# Patient Record
Sex: MALE | Race: WHITE | NOT HISPANIC OR LATINO | URBAN - METROPOLITAN AREA
[De-identification: names, ages, dates, MRNs, and addresses within clinical notes are randomized per-mention and may not be internally consistent; named-entity substitution may affect disease eponyms.]

---

## 2020-09-02 ENCOUNTER — OFFICE VISIT (OUTPATIENT)
Dept: URBAN - METROPOLITAN AREA CLINIC 121 | Facility: CLINIC | Age: 75
End: 2020-09-02

## 2021-03-11 ENCOUNTER — OFFICE VISIT (OUTPATIENT)
Dept: URBAN - METROPOLITAN AREA CLINIC 63 | Facility: CLINIC | Age: 76
End: 2021-03-11

## 2021-04-19 ENCOUNTER — OFFICE VISIT (OUTPATIENT)
Dept: URBAN - METROPOLITAN AREA SURGERY CENTER 4 | Facility: SURGERY CENTER | Age: 76
End: 2021-04-19

## 2021-04-21 LAB — PATHOLOGY (INDENTED REPORT): (no result)

## 2021-05-10 ENCOUNTER — OFFICE VISIT (OUTPATIENT)
Dept: URBAN - METROPOLITAN AREA CLINIC 63 | Facility: CLINIC | Age: 76
End: 2021-05-10

## 2022-07-09 ENCOUNTER — TELEPHONE ENCOUNTER (OUTPATIENT)
Dept: URBAN - METROPOLITAN AREA CLINIC 121 | Facility: CLINIC | Age: 77
End: 2022-07-09

## 2022-07-09 RX ORDER — COCOA BUTTER, PHENYLEPHRINE HYDROCHLORIDE 2211; 6.25 MG/1; MG/1
TAKE AS DIRECTED SUPPOSITORY RECTAL TAKE AS DIRECTED
Refills: 1 | OUTPATIENT
Start: 2016-03-17 | End: 2016-04-28

## 2022-07-09 RX ORDER — SILDENAFIL CITRATE 100 MG/1
TABLET, FILM COATED ORAL AS NEEDED
Refills: 0 | OUTPATIENT
Start: 2016-03-17 | End: 2016-04-28

## 2022-07-09 RX ORDER — DILTIAZEM HYDROCHLORIDE 120 MG/1
TABLET ORAL ONCE A DAY
Refills: 0 | OUTPATIENT
Start: 2014-11-10 | End: 2016-03-17

## 2022-07-09 RX ORDER — ROSUVASTATIN CALCIUM 10 MG
TABLET ORAL ONCE A DAY
Refills: 0 | OUTPATIENT
Start: 2017-09-14 | End: 2018-06-28

## 2022-07-09 RX ORDER — ATENOLOL 100 MG/1
TABLET ORAL ONCE A DAY
Refills: 0 | OUTPATIENT
Start: 2014-11-10 | End: 2016-03-17

## 2022-07-09 RX ORDER — WARFARIN 10 MG/1
TABLET ORAL TAKE AS DIRECTED
Refills: 0 | OUTPATIENT
Start: 2014-11-10 | End: 2016-03-17

## 2022-07-09 RX ORDER — DILTIAZEM HYDROCHLORIDE 180 MG/1
CAPSULE, EXTENDED RELEASE ORAL
Refills: 0 | OUTPATIENT
Start: 2021-02-20 | End: 2021-03-11

## 2022-07-09 RX ORDER — LISINOPRIL 20 MG/1
TABLET ORAL TWICE A DAY
Refills: 0 | OUTPATIENT
Start: 2021-03-11 | End: 2021-05-10

## 2022-07-09 RX ORDER — ROSUVASTATIN CALCIUM 10 MG/1
TABLET, FILM COATED ORAL
Refills: 0 | OUTPATIENT
Start: 2021-01-24 | End: 2021-03-11

## 2022-07-09 RX ORDER — METOPROLOL SUCCINATE 50 MG/1
TABLET, EXTENDED RELEASE ORAL
Refills: 0 | OUTPATIENT
Start: 2021-01-24 | End: 2021-03-11

## 2022-07-09 RX ORDER — HYDROCHLOROTHIAZIDE 12.5 MG/1
CAPSULE ORAL ONCE A DAY
Refills: 0 | OUTPATIENT
Start: 2017-09-14 | End: 2018-06-28

## 2022-07-09 RX ORDER — LISINOPRIL 20 MG/1
TABLET ORAL TAKE AS DIRECTED
Refills: 0 | OUTPATIENT
Start: 2017-09-14 | End: 2018-06-28

## 2022-07-09 RX ORDER — VITAMIN E (DL,TOCOPHERYL ACET) 180 MG
CAPSULE ORAL TWICE A DAY
Refills: 0 | OUTPATIENT
Start: 2021-03-11 | End: 2021-05-10

## 2022-07-09 RX ORDER — ROSUVASTATIN CALCIUM 10 MG
TABLET ORAL ONCE A DAY
Refills: 0 | OUTPATIENT
Start: 2018-06-28 | End: 2021-03-11

## 2022-07-09 RX ORDER — APIXABAN 5 MG/1
TABLET, FILM COATED ORAL TWICE A DAY
Refills: 0 | OUTPATIENT
Start: 2021-03-11 | End: 2021-05-10

## 2022-07-09 RX ORDER — HYDROCHLOROTHIAZIDE 12.5 MG/1
CAPSULE ORAL ONCE A DAY
Refills: 0 | OUTPATIENT
Start: 2018-06-28 | End: 2018-06-28

## 2022-07-09 RX ORDER — SILDENAFIL CITRATE 100 MG/1
TABLET, FILM COATED ORAL TAKE AS DIRECTED
Refills: 0 | OUTPATIENT
Start: 2014-11-10 | End: 2016-03-17

## 2022-07-09 RX ORDER — ZOLPIDEM TARTRATE 12.5 MG
TABLET, EXTENDED RELEASE MULTIPHASE ORAL TAKE AS DIRECTED
Refills: 0 | OUTPATIENT
Start: 2014-06-19 | End: 2014-11-10

## 2022-07-09 RX ORDER — LISINOPRIL 10 MG/1
TABLET ORAL ONCE A DAY
Refills: 0 | OUTPATIENT
Start: 2014-11-10 | End: 2016-03-17

## 2022-07-09 RX ORDER — ROSUVASTATIN CALCIUM 5 MG
TABLET ORAL TAKE AS DIRECTED
Refills: 0 | OUTPATIENT
Start: 2014-06-19 | End: 2014-11-10

## 2022-07-09 RX ORDER — ZOLPIDEM TARTRATE 12.5 MG
TABLET, EXTENDED RELEASE MULTIPHASE ORAL TAKE AS DIRECTED
Refills: 0 | OUTPATIENT
Start: 2014-11-10 | End: 2016-03-17

## 2022-07-09 RX ORDER — NYSTATIN 100000 [USP'U]/ML
TAKE AS DIRECTED5CC PO SWISH AND SPIT BID SUSPENSION ORAL TAKE AS DIRECTED
Refills: 2 | OUTPATIENT
Start: 2017-09-14 | End: 2018-06-28

## 2022-07-09 RX ORDER — ROSUVASTATIN CALCIUM 10 MG
TABLET ORAL ONCE A DAY
Refills: 0 | OUTPATIENT
Start: 2016-03-17 | End: 2016-04-28

## 2022-07-09 RX ORDER — ATENOLOL 50 MG/1
TABLET ORAL ONCE A DAY
Refills: 0 | OUTPATIENT
Start: 2016-04-28 | End: 2016-09-22

## 2022-07-09 RX ORDER — METHIMAZOLE 10 MG/1
TABLET ORAL
Refills: 0 | OUTPATIENT
Start: 2020-09-10 | End: 2021-03-11

## 2022-07-09 RX ORDER — ROSUVASTATIN CALCIUM 10 MG
TABLET ORAL ONCE A DAY
Refills: 0 | OUTPATIENT
Start: 2016-04-28 | End: 2016-09-22

## 2022-07-09 RX ORDER — ASPIRIN 81 MG/1
TABLET, COATED ORAL ONCE A DAY
Refills: 0 | OUTPATIENT
Start: 2016-03-17 | End: 2016-04-28

## 2022-07-09 RX ORDER — UNDERPADS 30"X36"
EACH MISCELLANEOUS ONCE A DAY
Refills: 0 | OUTPATIENT
Start: 2018-06-28 | End: 2021-03-11

## 2022-07-09 RX ORDER — NYSTATIN 100000 [USP'U]/ML
TAKE AS DIRECTED5CC PO BID  SWISH AND SPIT SUSPENSION ORAL TAKE AS DIRECTED
Refills: 0 | OUTPATIENT
Start: 2016-04-28 | End: 2016-09-22

## 2022-07-09 RX ORDER — WARFARIN 10 MG/1
TABLET ORAL TAKE AS DIRECTED
Refills: 0 | OUTPATIENT
Start: 2014-06-19 | End: 2014-11-10

## 2022-07-09 RX ORDER — ALBUTEROL SULFATE 90 UG/1
AEROSOL, METERED RESPIRATORY (INHALATION) AS NEEDED
Refills: 0 | OUTPATIENT
Start: 2021-03-11 | End: 2021-05-10

## 2022-07-09 RX ORDER — DILTIAZEM HYDROCHLORIDE 120 MG/1
TABLET ORAL TAKE AS DIRECTED
Refills: 0 | OUTPATIENT
Start: 2018-06-28 | End: 2021-03-11

## 2022-07-09 RX ORDER — LISINOPRIL 20 MG/1
TABLET ORAL ONCE A DAY
Refills: 0 | OUTPATIENT
Start: 2016-03-17 | End: 2016-04-28

## 2022-07-09 RX ORDER — SILDENAFIL CITRATE 100 MG/1
TABLET, FILM COATED ORAL AS NEEDED
Refills: 2 | OUTPATIENT
Start: 2016-04-28 | End: 2016-09-22

## 2022-07-09 RX ORDER — MAGNESIUM HYDROXIDE 400 MG/5ML
SUSPENSION, ORAL (FINAL DOSE FORM) ORAL ONCE A DAY
Refills: 0 | OUTPATIENT
Start: 2016-04-28 | End: 2016-09-22

## 2022-07-09 RX ORDER — POLYETHYLENE GLYCOL 3350, SODIUM SULFATE, SODIUM CHLORIDE, POTASSIUM CHLORIDE, ASCORBIC ACID, SODIUM ASCORBATE 7.5-2.691G
KIT ORAL TAKE AS DIRECTED
Refills: 0 | OUTPATIENT
Start: 2014-06-19 | End: 2014-11-10

## 2022-07-09 RX ORDER — HYDROCHLOROTHIAZIDE 12.5 MG/1
TABLET ORAL ONCE A DAY
Refills: 0 | OUTPATIENT
Start: 2016-03-17 | End: 2016-04-28

## 2022-07-09 RX ORDER — ASPIRIN 81 MG/1
TABLET, COATED ORAL ONCE A DAY
Refills: 2 | OUTPATIENT
Start: 2016-04-28 | End: 2016-09-22

## 2022-07-09 RX ORDER — METOPROLOL SUCCINATE 50 MG/1
TABLET, EXTENDED RELEASE ORAL ONCE A DAY
Refills: 0 | OUTPATIENT
Start: 2021-03-11 | End: 2021-05-10

## 2022-07-09 RX ORDER — WARFARIN 10 MG/1
TABLET ORAL ONCE A DAY
Refills: 0 | OUTPATIENT
Start: 2017-09-14 | End: 2018-06-28

## 2022-07-09 RX ORDER — DILTIAZEM HYDROCHLORIDE 120 MG/1
TABLET ORAL ONCE A DAY
Refills: 0 | OUTPATIENT
Start: 2016-03-17 | End: 2016-04-28

## 2022-07-09 RX ORDER — MULTIVIT-MIN/FERROUS FUMARATE 9 MG/15 ML
LIQUID (ML) ORAL ONCE A DAY
Refills: 0 | OUTPATIENT
Start: 2021-03-11 | End: 2021-05-10

## 2022-07-09 RX ORDER — LISINOPRIL 20 MG/1
TABLET ORAL
Refills: 0 | OUTPATIENT
Start: 2021-01-24 | End: 2021-03-11

## 2022-07-09 RX ORDER — HYDROCHLOROTHIAZIDE 12.5 MG/1
TABLET ORAL ONCE A DAY
Refills: 0 | OUTPATIENT
Start: 2016-04-28 | End: 2016-09-22

## 2022-07-09 RX ORDER — LISINOPRIL 20 MG/1
TABLET ORAL TAKE AS DIRECTED
Refills: 0 | OUTPATIENT
Start: 2018-06-28 | End: 2018-06-28

## 2022-07-09 RX ORDER — ATENOLOL 50 MG/1
TABLET ORAL ONCE A DAY
Refills: 0 | OUTPATIENT
Start: 2017-09-14 | End: 2018-06-28

## 2022-07-09 RX ORDER — ATENOLOL 50 MG/1
TABLET ORAL ONCE A DAY
Refills: 0 | OUTPATIENT
Start: 2016-03-17 | End: 2016-04-28

## 2022-07-09 RX ORDER — MELATONIN 3 MG
CAPSULE ORAL ONCE A DAY
Refills: 0 | OUTPATIENT
Start: 2021-03-11 | End: 2021-05-10

## 2022-07-09 RX ORDER — DILTIAZEM HYDROCHLORIDE 240 MG/1
CAPSULE, EXTENDED RELEASE ORAL ONCE A DAY
Refills: 0 | OUTPATIENT
Start: 2021-03-11 | End: 2021-05-10

## 2022-07-09 RX ORDER — UNDERPADS 30"X36"
EACH MISCELLANEOUS ONCE A DAY
Refills: 0 | OUTPATIENT
Start: 2017-09-14 | End: 2018-06-28

## 2022-07-09 RX ORDER — MAGNESIUM HYDROXIDE 400 MG/5ML
SUSPENSION, ORAL (FINAL DOSE FORM) ORAL ONCE A DAY
Refills: 0 | OUTPATIENT
Start: 2016-03-17 | End: 2016-04-28

## 2022-07-09 RX ORDER — LISINOPRIL 20 MG/1
TABLET ORAL ONCE A DAY
Refills: 0 | OUTPATIENT
Start: 2016-04-28 | End: 2016-09-22

## 2022-07-09 RX ORDER — LISINOPRIL 10 MG/1
TABLET ORAL ONCE A DAY
Refills: 0 | OUTPATIENT
Start: 2014-06-19 | End: 2014-11-10

## 2022-07-09 RX ORDER — CELECOXIB 200 MG/1
CAPSULE ORAL
Refills: 0 | OUTPATIENT
Start: 2020-12-19 | End: 2021-03-11

## 2022-07-09 RX ORDER — ROSUVASTATIN CALCIUM 5 MG
TABLET ORAL TAKE AS DIRECTED
Refills: 0 | OUTPATIENT
Start: 2014-11-10 | End: 2016-03-17

## 2022-07-09 RX ORDER — AMILORIDE HYDROCHLORIDE AND HYDROCHLOROTHIAZIDE 5; 50 MG/1; MG/1
TABLET ORAL
Refills: 0 | OUTPATIENT
Start: 2014-08-26 | End: 2016-03-17

## 2022-07-09 RX ORDER — OXYBUTYNIN CHLORIDE 5 MG/1
TABLET ORAL
Refills: 0 | OUTPATIENT
Start: 2021-02-25 | End: 2021-03-11

## 2022-07-09 RX ORDER — DILTIAZEM HYDROCHLORIDE 120 MG/1
TABLET ORAL ONCE A DAY
Refills: 0 | OUTPATIENT
Start: 2014-06-19 | End: 2014-11-10

## 2022-07-09 RX ORDER — DILTIAZEM HYDROCHLORIDE 120 MG/1
TABLET ORAL ONCE A DAY
Refills: 0 | OUTPATIENT
Start: 2016-04-28 | End: 2016-09-22

## 2022-07-09 RX ORDER — ATENOLOL 100 MG/1
TABLET ORAL ONCE A DAY
Refills: 0 | OUTPATIENT
Start: 2014-06-19 | End: 2014-11-10

## 2022-07-09 RX ORDER — DIPHENHYDRAMINE HCL, IBUPROFEN 25; 200 MG/1; MG/1
CAPSULE, LIQUID FILLED ORAL ONCE A DAY
Refills: 2 | OUTPATIENT
Start: 2016-04-28 | End: 2016-09-22

## 2022-07-09 RX ORDER — APIXABAN 5 MG/1
TABLET, FILM COATED ORAL
Refills: 0 | OUTPATIENT
Start: 2021-02-22 | End: 2021-03-11

## 2022-07-09 RX ORDER — SILDENAFIL CITRATE 100 MG/1
TABLET, FILM COATED ORAL TAKE AS DIRECTED
Refills: 0 | OUTPATIENT
Start: 2014-06-19 | End: 2014-11-10

## 2022-07-09 RX ORDER — DILTIAZEM HYDROCHLORIDE 120 MG/1
TABLET ORAL TAKE AS DIRECTED
Refills: 0 | OUTPATIENT
Start: 2018-06-28 | End: 2018-06-28

## 2022-07-09 RX ORDER — CELECOXIB 200 MG/1
CAPSULE ORAL ONCE A DAY
Refills: 0 | OUTPATIENT
Start: 2021-03-11 | End: 2021-05-10

## 2022-07-09 RX ORDER — DILTIAZEM HYDROCHLORIDE 120 MG/1
TABLET ORAL TAKE AS DIRECTED
Refills: 0 | OUTPATIENT
Start: 2017-09-14 | End: 2018-06-28

## 2022-07-09 RX ORDER — OXYBUTYNIN CHLORIDE 5 MG/1
TABLET ORAL ONCE A DAY
Refills: 0 | OUTPATIENT
Start: 2021-03-11 | End: 2021-05-10

## 2022-07-10 ENCOUNTER — TELEPHONE ENCOUNTER (OUTPATIENT)
Dept: URBAN - METROPOLITAN AREA CLINIC 121 | Facility: CLINIC | Age: 77
End: 2022-07-10

## 2022-07-10 RX ORDER — WARFARIN 10 MG/1
TABLET ORAL ONCE A DAY
Refills: 0 | Status: ACTIVE | COMMUNITY
Start: 2018-06-28

## 2022-07-10 RX ORDER — DIPHENHYDRAMINE HCL, IBUPROFEN 25; 200 MG/1; MG/1
CAPSULE, LIQUID FILLED ORAL ONCE A DAY
Refills: 2 | Status: ACTIVE | COMMUNITY
Start: 2016-09-22

## 2022-07-10 RX ORDER — LISINOPRIL 20 MG/1
TABLET ORAL TWICE A DAY
Refills: 0 | Status: ACTIVE | COMMUNITY
Start: 2021-05-10

## 2022-07-10 RX ORDER — ATENOLOL 50 MG/1
TABLET ORAL ONCE A DAY
Refills: 0 | Status: ACTIVE | COMMUNITY
Start: 2016-09-22

## 2022-07-10 RX ORDER — DILTIAZEM HYDROCHLORIDE 120 MG/1
TABLET ORAL ONCE A DAY
Refills: 0 | Status: ACTIVE | COMMUNITY
Start: 2016-09-22

## 2022-07-10 RX ORDER — ROSUVASTATIN CALCIUM 10 MG
TABLET ORAL ONCE A DAY
Refills: 0 | Status: ACTIVE | COMMUNITY
Start: 2016-09-22

## 2022-07-10 RX ORDER — MELATONIN 3 MG
CAPSULE ORAL ONCE A DAY
Refills: 0 | Status: ACTIVE | COMMUNITY
Start: 2021-05-10

## 2022-07-10 RX ORDER — OXYBUTYNIN CHLORIDE 5 MG/1
TABLET ORAL ONCE A DAY
Refills: 0 | Status: ACTIVE | COMMUNITY
Start: 2021-05-10

## 2022-07-10 RX ORDER — NYSTATIN 100000 [USP'U]/ML
TAKE AS DIRECTED5CC PO SWISH AND SPIT BID SUSPENSION ORAL TAKE AS DIRECTED
Refills: 2 | Status: ACTIVE | COMMUNITY
Start: 2018-06-28

## 2022-07-10 RX ORDER — MULTIVIT-MIN/FERROUS FUMARATE 9 MG/15 ML
LIQUID (ML) ORAL ONCE A DAY
Refills: 0 | Status: ACTIVE | COMMUNITY
Start: 2021-05-10

## 2022-07-10 RX ORDER — DILTIAZEM HYDROCHLORIDE 240 MG/1
CAPSULE, EXTENDED RELEASE ORAL ONCE A DAY
Refills: 0 | Status: ACTIVE | COMMUNITY
Start: 2021-05-10

## 2022-07-10 RX ORDER — METHIMAZOLE 10 MG/1
TABLET ORAL TAKE AS DIRECTED
Refills: 0 | Status: ACTIVE | COMMUNITY
Start: 2021-03-11

## 2022-07-10 RX ORDER — SILDENAFIL CITRATE 100 MG/1
TABLET, FILM COATED ORAL AS NEEDED
Refills: 2 | Status: ACTIVE | COMMUNITY
Start: 2016-09-22

## 2022-07-10 RX ORDER — LISINOPRIL 20 MG/1
TABLET ORAL TAKE AS DIRECTED
Refills: 0 | Status: ACTIVE | COMMUNITY
Start: 2018-06-28

## 2022-07-10 RX ORDER — HYDROCHLOROTHIAZIDE 12.5 MG/1
TABLET ORAL ONCE A DAY
Refills: 0 | Status: ACTIVE | COMMUNITY
Start: 2016-09-22

## 2022-07-10 RX ORDER — METOPROLOL SUCCINATE 50 MG/1
TABLET, EXTENDED RELEASE ORAL ONCE A DAY
Refills: 0 | Status: ACTIVE | COMMUNITY
Start: 2021-05-10

## 2022-07-10 RX ORDER — ASPIRIN 81 MG/1
TABLET, COATED ORAL ONCE A DAY
Refills: 2 | Status: ACTIVE | COMMUNITY
Start: 2016-09-22

## 2022-07-10 RX ORDER — CELECOXIB 200 MG/1
CAPSULE ORAL ONCE A DAY
Refills: 0 | Status: ACTIVE | COMMUNITY
Start: 2021-05-10

## 2022-07-10 RX ORDER — ATENOLOL 50 MG/1
TABLET ORAL ONCE A DAY
Refills: 0 | Status: ACTIVE | COMMUNITY
Start: 2018-06-28

## 2022-07-10 RX ORDER — ALBUTEROL SULFATE 90 UG/1
AEROSOL, METERED RESPIRATORY (INHALATION) AS NEEDED
Refills: 0 | Status: ACTIVE | COMMUNITY
Start: 2021-05-10

## 2022-07-10 RX ORDER — LISINOPRIL 20 MG/1
TABLET ORAL ONCE A DAY
Refills: 0 | Status: ACTIVE | COMMUNITY
Start: 2016-09-22

## 2022-07-10 RX ORDER — APIXABAN 5 MG/1
TABLET, FILM COATED ORAL TWICE A DAY
Refills: 0 | Status: ACTIVE | COMMUNITY
Start: 2021-05-10

## 2022-07-10 RX ORDER — MAGNESIUM HYDROXIDE 400 MG/5ML
SUSPENSION, ORAL (FINAL DOSE FORM) ORAL ONCE A DAY
Refills: 0 | Status: ACTIVE | COMMUNITY
Start: 2016-09-22

## 2022-07-10 RX ORDER — VITAMIN E (DL,TOCOPHERYL ACET) 180 MG
CAPSULE ORAL TWICE A DAY
Refills: 0 | Status: ACTIVE | COMMUNITY
Start: 2021-05-10

## 2023-12-01 ENCOUNTER — APPOINTMENT (RX ONLY)
Dept: URBAN - METROPOLITAN AREA CLINIC 116 | Facility: CLINIC | Age: 78
Setting detail: DERMATOLOGY
End: 2023-12-01

## 2023-12-01 DIAGNOSIS — L63.1 ALOPECIA UNIVERSALIS: ICD-10-CM | Status: INADEQUATELY CONTROLLED

## 2023-12-01 DIAGNOSIS — Z48.02 ENCOUNTER FOR REMOVAL OF SUTURES: ICD-10-CM

## 2023-12-01 PROCEDURE — 99204 OFFICE O/P NEW MOD 45 MIN: CPT

## 2023-12-01 PROCEDURE — ? PRESCRIPTION MEDICATION MANAGEMENT

## 2023-12-01 PROCEDURE — ? COUNSELING

## 2023-12-01 ASSESSMENT — LOCATION ZONE DERM
LOCATION ZONE: SCALP
LOCATION ZONE: FACE

## 2023-12-01 ASSESSMENT — LOCATION DETAILED DESCRIPTION DERM
LOCATION DETAILED: RIGHT SUPERIOR PARIETAL SCALP
LOCATION DETAILED: SUPERIOR MID FOREHEAD

## 2023-12-01 ASSESSMENT — SEVERITY OF ALOPECIA TOOL: % SCALP HAIR LOST: 100

## 2023-12-01 ASSESSMENT — SEVERITY ASSESSMENT OVERALL AMONG ALL PATIENTS
IN YOUR EXPERIENCE, AMONG ALL PATIENTS YOU HAVE SEEN WITH THIS CONDITION, HOW SEVERE IS THIS PATIENT'S CONDITION?: S5 (100% HAIR LOSS)

## 2023-12-01 ASSESSMENT — LOCATION SIMPLE DESCRIPTION DERM
LOCATION SIMPLE: SUPERIOR FOREHEAD
LOCATION SIMPLE: SCALP

## 2023-12-01 NOTE — PROCEDURE: PRESCRIPTION MEDICATION MANAGEMENT
Detail Level: Detailed
Render In Strict Bullet Format?: Yes
Plan: We discussed the current TESSA inhibitors approved for AA. We discussed that due to his age and comorbidities he will be at high risk for adverse events. Patient and spouse expressed understanding and opted to observe condition. He is up to date with regular pcp appointments and denies thyroid disease or other autoimmune conditions. He gets FBSE yearly with Dr. Tripp Gregory with no findings for melanoma. Also, he admits to going to dentist and eye doctor annually.